# Patient Record
(demographics unavailable — no encounter records)

---

## 2025-03-14 NOTE — ASSESSMENT
[FreeTextEntry1] : Patient is an 87 yo F with asthma, HTN, Severe rheumatoid (?) arthritis, presents for evaluation of HTN and CKD  CKD - unclear baseline, will obtain baseline labs, Does have arthritis so will send autoimmune workup as well to be sure nothing is missed. States has been stable for many years, will obtain outside records and compare.  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

## 2025-03-14 NOTE — HISTORY OF PRESENT ILLNESS
[FreeTextEntry1] : Patient is an 89 yo F with asthma, HTN, Severe rheumatoid (?) arthritis, presents for evaluation of HTN and CKD  Pulm - carlos  Nephrologic History: Stones: no  NSAID use: None HTN: Many years DM: Non Prior nephrologist: None Kidney biopsy: No Reduced kidney mass (prematurity): No  Pre-eclampsia: No Urination history: 3-4 x a night  Family Hx: No first degree relatives with kidney disease  Surgical Hx: Leg Social Hx: No smoking or etoh, no ivdu DOES have second hand smoke from  Allergies: NKDA Meds: Vit d3, magnesioum, montelukast, selfonomide 10, famotidine 20, losartan-hctz 50-12.5, rosuvastatin 10, hydroxychloroquin 2000,    Doctors: Pulcecilia gonzalez Cardiology Dr Edilson Islas [phone] fx [phone] Internal medicine: [phone] Beatriz [phone] Rheum:

## 2025-03-14 NOTE — HISTORY OF PRESENT ILLNESS
[FreeTextEntry1] : Patient is an 89 yo F with asthma, HTN, Severe rheumatoid (?) arthritis, presents for evaluation of HTN and CKD  Pulm - carlos  Nephrologic History: Stones: no  NSAID use: None HTN: Many years DM: Non Prior nephrologist: None Kidney biopsy: No Reduced kidney mass (prematurity): No  Pre-eclampsia: No Urination history: 3-4 x a night  Family Hx: No first degree relatives with kidney disease  Surgical Hx: Leg Social Hx: No smoking or etoh, no ivdu DOES have second hand smoke from  Allergies: NKDA Meds: Vit d3, magnesioum, montelukast, selfonomide 10, famotidine 20, losartan-hctz 50-12.5, rosuvastatin 10, hydroxychloroquin 2000,    Doctors: Pulcecilia gonzalez Cardiology Dr Edilson Islas [phone] fx [phone] Internal medicine: [phone] Beatriz [phone] Rheum:

## 2025-03-14 NOTE — ASSESSMENT
[FreeTextEntry1] : Patient is an 89 yo F with asthma, HTN, Severe rheumatoid (?) arthritis, presents for evaluation of HTN and CKD  CKD - unclear baseline, will obtain baseline labs, Does have arthritis so will send autoimmune workup as well to be sure nothing is missed. States has been stable for many years, will obtain outside records and compare.  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

## 2025-06-13 NOTE — ASSESSMENT
[FreeTextEntry1] : Patient is an 87 yo F with asthma, HTN, Severe rheumatoid (?) arthritis, presents for evaluation of HTN and CKD  CKD - Mild at baseline, excellent HTN control and no active autoimmune disease, unlikely to progres as long as HTN controlled and no hypovolemia. Discussed in detail.   HTN - above goal here in office, change to irbesartan alone from losartan-hctz as dry on exam, will monitor home bps and call if elevated Discussed how to take BP appropriately at home. Advised not to smoke, drink caffeinated beverages or exercise within 30 minutes before measuring BP. Make sure cuff fits arm, as small cuffs can artificially raise BP. Make sure bladder is empty. With the cuff on your bare arm, sit in an upright position with back supported, feet flat on the floor and arm supported at heart level. Make sure the bottom of the cuff is directly above the bend of the elbow. Relax for about five minutes before taking a measurement. Resist the urge to talk or look at a cellphone. Wait one minute and retake BP, consider 3rd if elevated. Average the three readings and record in log book, bring to each check up.  Bring device at next visit to ensure accuracy  MBD-CKD - phos pth at goal  Anemia-CKD - hgb at goal   RTC in 3-4 months with labs

## 2025-06-13 NOTE — HISTORY OF PRESENT ILLNESS
[FreeTextEntry1] : Patient is an 87 yo F with asthma, HTN, Severe rheumatoid (?) arthritis, presents for followup of HTN and CKD  Feeling well since last seen, reviewed labs from last visit in detail and BP meds, stable, major issue is need to increase exercise to stay active which will help with arthritis as well    Doctors: Pulm Dr gonzalez Cardiology Dr Edilson Islas [phone] fx [phone] Internal medicine: [phone] Fax [phone] Rheum:

## 2025-06-13 NOTE — PHYSICAL EXAM
[General Appearance - Alert] : alert [General Appearance - In No Acute Distress] : in no acute distress [Sclera] : the sclera and conjunctiva were normal [PERRL With Normal Accommodation] : pupils were equal in size, round, and reactive to light [Extraocular Movements] : extraocular movements were intact [Outer Ear] : the ears and nose were normal in appearance [Oropharynx] : the oropharynx was normal [Neck Appearance] : the appearance of the neck was normal [Neck Cervical Mass (___cm)] : no neck mass was observed [Jugular Venous Distention Increased] : there was no jugular-venous distention [Thyroid Nodule] : there were no palpable thyroid nodules [Thyroid Diffuse Enlargement] : the thyroid was not enlarged [Respiration, Rhythm And Depth] : normal respiratory rhythm and effort [Exaggerated Use Of Accessory Muscles For Inspiration] : no accessory muscle use [Auscultation Breath Sounds / Voice Sounds] : lungs were clear to auscultation bilaterally [Heart Rate And Rhythm] : heart rate was normal and rhythm regular [Heart Sounds Gallop] : no gallops [Heart Sounds] : normal S1 and S2 [Murmurs] : no murmurs [Heart Sounds Pericardial Friction Rub] : no pericardial rub [Edema] : there was no peripheral edema [Veins - Varicosity Changes] : there were no varicosital changes [Bowel Sounds] : normal bowel sounds [Abdomen Soft] : soft [Abdomen Tenderness] : non-tender [Abdomen Mass (___ Cm)] : no abdominal mass palpated [No CVA Tenderness] : no ~M costovertebral angle tenderness [No Spinal Tenderness] : no spinal tenderness [Abnormal Walk] : normal gait [Involuntary Movements] : no involuntary movements were seen [Skin Color & Pigmentation] : normal skin color and pigmentation [] : no rash [Skin Turgor] : normal skin turgor [Cranial Nerves] : cranial nerves 2-12 were intact [No Focal Deficits] : no focal deficits [Affect] : the affect was normal [Mood] : the mood was normal